# Patient Record
Sex: FEMALE | Race: WHITE | NOT HISPANIC OR LATINO | Employment: OTHER
[De-identification: names, ages, dates, MRNs, and addresses within clinical notes are randomized per-mention and may not be internally consistent; named-entity substitution may affect disease eponyms.]

---

## 2023-10-11 ENCOUNTER — TELEPHONE (OUTPATIENT)
Dept: OTHER | Age: 78
End: 2023-10-11
Payer: MEDICARE

## 2023-10-11 NOTE — TELEPHONE ENCOUNTER
Pharmacy calling in to get medications rerouted to their location.   Clonazepam 0.5mg 1/2 tablet 3 times a day.     Please send to [facility].   Phone: [phone].

## 2023-10-12 ENCOUNTER — TELEPHONE (OUTPATIENT)
Dept: OTHER | Age: 78
End: 2023-10-12
Payer: MEDICARE

## 2023-10-12 DIAGNOSIS — F41.1 GAD (GENERALIZED ANXIETY DISORDER): ICD-10-CM

## 2023-10-12 DIAGNOSIS — Z79.899 LONG-TERM CURRENT USE OF BENZODIAZEPINE: ICD-10-CM

## 2023-10-12 DIAGNOSIS — G47.09 OTHER INSOMNIA: ICD-10-CM

## 2023-10-12 DIAGNOSIS — F41.9 ANXIETY AND DEPRESSION: ICD-10-CM

## 2023-10-12 DIAGNOSIS — F41.8 DEPRESSION WITH ANXIETY: ICD-10-CM

## 2023-10-12 DIAGNOSIS — F32.A ANXIETY AND DEPRESSION: ICD-10-CM

## 2023-10-12 RX ORDER — CLONAZEPAM 0.5 MG/1
0.25 TABLET ORAL 3 TIMES DAILY
Qty: 45 TABLET | Refills: 0 | Status: SHIPPED | OUTPATIENT
Start: 2023-10-12 | End: 2024-05-02 | Stop reason: SDUPTHER

## 2023-10-12 NOTE — PROGRESS NOTES
Oarrs ran , will need to still do urine drug screen and benzodiazepine confirm within one month , reviewed most recent note , patient had called and requested a refill to a new pharmacy as the current one is out of stock of the medication . Sent a 30 day med supply. Nursing to do psychoeducation related to med use .[facility]

## 2023-10-13 ENCOUNTER — LAB (OUTPATIENT)
Dept: LAB | Facility: LAB | Age: 78
End: 2023-10-13
Payer: MEDICARE

## 2023-10-13 DIAGNOSIS — Z79.899 LONG-TERM CURRENT USE OF BENZODIAZEPINE: ICD-10-CM

## 2023-10-13 LAB
AMPHETAMINES UR QL SCN: NORMAL
BARBITURATES UR QL SCN: NORMAL
BENZODIAZ UR QL SCN: NORMAL
BZE UR QL SCN: NORMAL
CANNABINOIDS UR QL SCN: NORMAL
FENTANYL+NORFENTANYL UR QL SCN: NORMAL
OPIATES UR QL SCN: NORMAL
OXYCODONE+OXYMORPHONE UR QL SCN: NORMAL
PCP UR QL SCN: NORMAL

## 2023-10-13 PROCEDURE — 80307 DRUG TEST PRSMV CHEM ANLYZR: CPT

## 2023-10-13 PROCEDURE — 80346 BENZODIAZEPINES1-12: CPT

## 2023-10-17 LAB
1OH-MIDAZOLAM UR CFM-MCNC: <25 NG/ML
7AMINOCLONAZEPAM UR CFM-MCNC: 378 NG/ML
A-OH ALPRAZ UR CFM-MCNC: <25 NG/ML
ALPRAZ UR CFM-MCNC: <25 NG/ML
CHLORDIAZEP UR CFM-MCNC: <25 NG/ML
CLONAZEPAM UR CFM-MCNC: <25 NG/ML
DIAZEPAM UR CFM-MCNC: <25 NG/ML
LORAZEPAM UR CFM-MCNC: <25 NG/ML
MIDAZOLAM UR CFM-MCNC: <25 NG/ML
NORDIAZEPAM UR CFM-MCNC: <25 NG/ML
OXAZEPAM UR CFM-MCNC: <25 NG/ML
TEMAZEPAM UR CFM-MCNC: <25 NG/ML

## 2023-11-01 ENCOUNTER — TELEPHONE (OUTPATIENT)
Dept: BEHAVIORAL HEALTH | Facility: CLINIC | Age: 78
End: 2023-11-01
Payer: MEDICARE

## 2023-11-01 NOTE — TELEPHONE ENCOUNTER
----- Message from Edith Lagunas MA sent at 10/31/2023  1:32 PM EDT -----  Regarding: pt callback  Pt would like a callback about clonazepam medication, pt is short on prescription would like an extra set of pills called the pharmacy until next med pick  [phone] pts callback number

## 2023-11-01 NOTE — PROGRESS NOTES
Non billable note : reviewed oarrs after received message from staff patient requests a call from me and it mentions about needing a refill for clonazepam , she should not be out per oarrs , filled this in October . Reviewed most recent appointment psych note and she is scheduled for an appointment in mid November. Left a voicemail general in nature indicating patient can call me and provide more details about her medication concern . [facility]

## 2023-11-06 PROBLEM — Z20.822 EXPOSURE TO COVID-19 VIRUS: Status: ACTIVE | Noted: 2023-11-06

## 2023-11-06 PROBLEM — J20.9 ACUTE BRONCHITIS, UNSPECIFIED: Status: ACTIVE | Noted: 2023-11-06

## 2023-11-06 PROBLEM — R44.0 AUDITORY HALLUCINATION: Status: ACTIVE | Noted: 2023-11-06

## 2023-11-06 PROBLEM — E55.9 VITAMIN D DEFICIENCY: Status: ACTIVE | Noted: 2023-11-06

## 2023-11-06 PROBLEM — F05 SUNDOWN SYNDROME: Status: ACTIVE | Noted: 2023-11-06

## 2023-11-06 PROBLEM — M81.0 AGE RELATED OSTEOPOROSIS: Status: ACTIVE | Noted: 2023-11-06

## 2023-11-06 PROBLEM — L70.9 ACNE: Status: ACTIVE | Noted: 2023-11-06

## 2023-11-06 PROBLEM — G47.9 SLEEP DIFFICULTIES: Status: ACTIVE | Noted: 2023-11-06

## 2023-11-06 PROBLEM — L29.9 PRURITUS: Status: ACTIVE | Noted: 2023-11-06

## 2023-11-06 PROBLEM — F41.0 PANIC ATTACKS: Status: ACTIVE | Noted: 2023-11-06

## 2023-11-06 PROBLEM — R00.2 HEART PALPITATIONS: Status: ACTIVE | Noted: 2023-11-06

## 2023-11-06 PROBLEM — E78.49 OTHER HYPERLIPIDEMIA: Status: ACTIVE | Noted: 2023-11-06

## 2023-11-06 PROBLEM — J44.9 CHRONIC OBSTRUCTIVE PULMONARY DISEASE (MULTI): Status: ACTIVE | Noted: 2023-11-06

## 2023-11-06 PROBLEM — R05.3 COUGH, PERSISTENT: Status: ACTIVE | Noted: 2023-11-06

## 2023-11-06 PROBLEM — F41.9 ANXIETY: Status: ACTIVE | Noted: 2023-11-06

## 2023-11-06 PROBLEM — R31.29 MICROSCOPIC HEMATURIA: Status: ACTIVE | Noted: 2023-11-06

## 2023-11-06 PROBLEM — D72.829 LEUCOCYTOSIS: Status: ACTIVE | Noted: 2023-11-06

## 2023-11-06 PROBLEM — R44.1 VISUAL HALLUCINATIONS: Status: ACTIVE | Noted: 2023-11-06

## 2023-11-06 PROBLEM — R53.83 FATIGUE: Status: ACTIVE | Noted: 2023-11-06

## 2023-11-06 PROBLEM — F41.8 DEPRESSION WITH ANXIETY: Status: ACTIVE | Noted: 2023-11-06

## 2023-11-06 RX ORDER — LEVOFLOXACIN 750 MG/1
750 TABLET ORAL
COMMUNITY
Start: 2013-04-09

## 2023-11-06 RX ORDER — CLONAZEPAM 0.5 MG/1
TABLET ORAL
COMMUNITY
Start: 2021-08-11 | End: 2023-11-13 | Stop reason: SDUPTHER

## 2023-11-06 RX ORDER — ALBUTEROL SULFATE 90 UG/1
2 AEROSOL, METERED RESPIRATORY (INHALATION) EVERY 4 HOURS PRN
COMMUNITY
Start: 2021-12-24 | End: 2024-02-05 | Stop reason: WASHOUT

## 2023-11-06 RX ORDER — IBUPROFEN 800 MG/1
1 TABLET ORAL EVERY 8 HOURS PRN
COMMUNITY
Start: 2022-05-18

## 2023-11-06 RX ORDER — VENLAFAXINE HYDROCHLORIDE 37.5 MG/1
37.5 CAPSULE, EXTENDED RELEASE ORAL
COMMUNITY
End: 2023-11-13 | Stop reason: SDUPTHER

## 2023-11-06 RX ORDER — RISPERIDONE 0.25 MG/1
0.25 TABLET ORAL NIGHTLY
COMMUNITY
End: 2023-11-13 | Stop reason: SDUPTHER

## 2023-11-06 RX ORDER — HYDROCODONE BITARTRATE AND HOMATROPINE METHYLBROMIDE ORAL SOLUTION 5; 1.5 MG/5ML; MG/5ML
5 LIQUID ORAL EVERY 6 HOURS PRN
COMMUNITY
Start: 2021-12-24 | End: 2024-05-02 | Stop reason: WASHOUT

## 2023-11-06 RX ORDER — VENLAFAXINE HYDROCHLORIDE 75 MG/1
75 CAPSULE, EXTENDED RELEASE ORAL DAILY
COMMUNITY
Start: 2023-10-20 | End: 2023-11-13 | Stop reason: SDUPTHER

## 2023-11-06 RX ORDER — VIT C/E/ZN/COPPR/LUTEIN/ZEAXAN 250MG-90MG
1 CAPSULE ORAL DAILY
COMMUNITY
Start: 2022-04-05

## 2023-11-13 ENCOUNTER — TELEMEDICINE (OUTPATIENT)
Dept: BEHAVIORAL HEALTH | Facility: CLINIC | Age: 78
End: 2023-11-13
Payer: MEDICARE

## 2023-11-13 DIAGNOSIS — F32.A DEPRESSION, UNSPECIFIED DEPRESSION TYPE: ICD-10-CM

## 2023-11-13 DIAGNOSIS — F41.1 GAD (GENERALIZED ANXIETY DISORDER): ICD-10-CM

## 2023-11-13 PROCEDURE — 99213 OFFICE O/P EST LOW 20 MIN: CPT | Performed by: CLINICAL NURSE SPECIALIST

## 2023-11-13 RX ORDER — RISPERIDONE 0.25 MG/1
0.25 TABLET ORAL NIGHTLY
Qty: 90 TABLET | Refills: 0 | Status: SHIPPED | OUTPATIENT
Start: 2023-11-13 | End: 2024-02-05 | Stop reason: SDUPTHER

## 2023-11-13 RX ORDER — CLONAZEPAM 0.5 MG/1
0.25 TABLET ORAL 3 TIMES DAILY
Qty: 45 TABLET | Refills: 2 | Status: SHIPPED | OUTPATIENT
Start: 2023-11-13 | End: 2024-02-05 | Stop reason: SDUPTHER

## 2023-11-13 RX ORDER — VENLAFAXINE HYDROCHLORIDE 75 MG/1
75 CAPSULE, EXTENDED RELEASE ORAL DAILY
Qty: 90 CAPSULE | Refills: 0 | Status: SHIPPED | OUTPATIENT
Start: 2023-11-13 | End: 2024-02-05 | Stop reason: SDUPTHER

## 2023-11-13 RX ORDER — VENLAFAXINE HYDROCHLORIDE 37.5 MG/1
37.5 CAPSULE, EXTENDED RELEASE ORAL
Qty: 90 CAPSULE | Refills: 0 | Status: SHIPPED | OUTPATIENT
Start: 2023-11-13 | End: 2024-02-05 | Stop reason: WASHOUT

## 2023-11-13 NOTE — PROGRESS NOTES
Outpatient Psychiatry      Subjective   Ara Dacosta, a 78 y.o. female, presents as an established patient for a medication management /outpatient psychiatry appointment     Diagnosis:   Depression , unspecified depression type   Generalized anxiety disorder     Patient Active Problem List   Diagnosis    Acne    Acute bronchitis, unspecified    Age related osteoporosis    Anxiety    Auditory hallucination    Visual hallucinations    Chronic obstructive pulmonary disease (CMS/HCC)    Cough, persistent    Depression with anxiety    Exposure to COVID-19 virus    Fatigue    Heart palpitations    Leucocytosis    Microscopic hematuria    Other hyperlipidemia    Panic attacks    Pruritus    Sleep difficulties    SunDown syndrome    Tobacco use disorder    Vitamin D deficiency       Treatment Goals:  Specify outcomes written in observable, behavioral terms:   Anxiety: reducing negative automatic thoughts and reducing physical symptoms of anxiety  Depression: increasing energy, increasing motivation, and reducing fatigue    Treatment Plan/Recommendations:   follow up for medications in 3 months   can call  for any treatment concerns   can continue self care and wellness efforts and maintain routine health screenings  Follow-up plan was discussed with patient.    Review with patient: Treatment plan reviewed with the patient.  Medication risks/benefit reviewed with the patient    HPI:  sleep is good  no psychoses   mood stable   no recent hospitalizations or er visits   she is aware of the precautions and risks with clonazepam, as increased sedation , potential increased sedation with other sedating medications , and discussed avoiding alcohol, discussed the potential for effect on memory and cognitive functioning , and discussed the potential for tolerance and dependence and addiction   no issues with substance abuse   no concerns with cognitive functioning   reviewed psychotropic medications and rationale  for them , and reconciled medication list   she does not have any side effect concerns with her medications     Review of Systems     Depressive Symptoms: not depressed or irritable,~no loss of interest,~no change in appetite,~no recent lb weight gain,~no recent lb weight loss,~no insomnia,~no fatigue or loss of energy,~not feeling worthless or guilty,~normal concentration,~ability to make decisions,~no suicidal ideation,~no guns or weapons in household.   Manic Symptoms: mood is not irritable or elevated,~self esteem is not grandiose or increased,~no changes in need for sleep,~not more talkative than usual,~does not have flight of ideas or racing thoughts,~no distractibility,~no psychomotor agitation or increased goal-directed activity,~no excessive involvement in pleasurable activities.   Psychotic Symptoms: no hallucinations,~no delusions,~no disorganized speech,~does not have disorganized behavior or catatonia,~no negative symptoms.   Anxiety Symptoms: exposure to traumatic event, but~no panic attacks,~no concerns about future panic attacks,~no worry about panic attack consequences,~no change in behavior due to panic attacks,~no excessive worry,~no difficulty controlling worry,~no increase in arousal,~not easily fatigued due to worry,~no difficulty concentrating due to worry,~no irritability due to worry,~no muscle tension due to worry,~no sleep disturbances due to worry,~no specific phobia,~no social phobia,~no obsessions,~no compulsions,~no re-experiencing of traumatic event,~no avoidance of stimuli and number of responsiveness,~no restlessness / feeling on edge due to worry.   Delirium/ Altered Mental Status Symptoms: no disturbances of consciousness,~no diminished ability to focus, sustain, shift attention,~no change in cognition or perceptual disturbances,~symptoms do not fluctuate during the course of the day,~general medical condition is not present.   Disordered Eating Symptoms: weight is not less than  85% of ideal body weight,~no intense fear of gaining weight,~does not have a poor body image,~no restricting of diet and/or excessive exercise,~no purging or laxative use.   Post-traumatic stress disorder symptoms The patient is currently asymptomatic.   Inattentive Symptoms: does not make careless mistakes often,~does not have difficulty paying attention,~not often disorganized,~does not lose things often,~is not easily distracted,~is not often forgetful,~does not avoid/dislike tasks with sustained mental effort,~listens when spoken to directly,~is able to follow instructions and finish schoolwork.   Conduct Issues: no aggression towards people or animals,~no destruction of property,~no deceitfulness,~does not violate rules.   Other Symptoms/ Concerns: no symptoms of separation anxiety,~no reactive attachment symptoms,~no motor tics,~no vocal tics,~no stuttering,~no phonological problems,~no loss of urine control,~no encopresis,~no intellectual disability,~no self-injurious behaviors,~not somatic and no conversion symptoms,~no gender identity symptoms,~no sleep disorder symptoms,~no impulse control symptoms,~no personality disorder symptoms.       Constitutional: no sleep apnea,~normal sleeping,~no night wakings,~no snoring,~not a picky eater,~normal appetite,~no swallowing problems,~no night terrors,~no nightmares,~no restless sleep,~no snorts/gasps~and~no obesity.   Eyes: no vision test,~no vision impairment,~does not wear glasses/contacts,~does not wear glassess/contacts~and~no blindness.   ENT: no hearing tested,~no hearing loss,~no hearing aid,~no cochlear implant,~no excessive drooling,~no dental problems~and~no recurrent strep throat.   Cardiovascular: no murmur,~no heart defect,~no chest pain,~no palpitations~and~no syncope.   Respiratory: no wheezing~and~no asthma/reactive airway disease.   Gastrointestinal: no constipation,~no abdominal pain,~no nausea,~no vomiting,~no diarrhea,~no blood in stools,~no  g-tube~and~no reflux.   Genitourinary: no nocturnal enuresis,~no diurnal enuresis~and~no incontinence.   Musculoskeletal: normal gait~and~no torticollis, but~moving all extremities well and symmetrical,~no arthritis or joint problems,~no myalgias,~no muscle weakness~and~normal hand preference.   Integumentary: no changes in moles or birthmarks,~no rashes~and~no atopic dermatitis.   Neurological: no symmetrical facies,~no headache,~no head injury,~no seizures,~no staring spells,~no loss of consciousness,~no meningitis/encephalitis,~no cerebral palsy,~no spina bifida,~no stereotypy,~no developmental regression~and~no tics or twitches.   Endocrine: no temperature intolerance,~,~good growth~and~no failure to thrive.   Hematologic/Lymphatic: no anemia~and~no lead poisoning.      I have personally reviewed the OARRS report for NITISH VELAZQUEZ. I have considered the risks of abuse, dependence, addiction and diversion.   I have the following concerns: no concerns with oarrs.   Is the patient prescribed a combination of a benzodiazepine and opioid? No.   Last urine drug screening date October 2023 , results as expected   Date of the last Controlled Substance Agreement: verbally reviewed csa   BENZODIAZEPINES   What is the patient's goal of therapy? to manage maximilian and reduce intense anxiety.  Is this being achieved with current treatment? yes , she is able to attend to day to day activities.   MAXIMILIAN-7   1. Feeling nervous, anxious or on edge- not at all  2. Not being able to stop or control worrying - not at all  3. Worrying too much about different things - not at all  4. Trouble relaxing - not at all  5. Being so restless that it is hard to sit still - not at all  6. Becoming easily annoyed or irritable - not at all  7. Feeling afraid as if something awful might happen - not at all  Total Score = 0  Activities of Daily Living:   Yes, it is my opinion that this patient is benefitting from benzodiazepine therapy.   Physical  functioning: Better   Family relationships: Better   Social relationships: Better   Mood: Better   Sleep patterns: Better   Overall functioning: Better   Current or Past Use of Non-Controlled Medication: Other: risperidone .   Current Outpatient Medications:     albuterol 90 mcg/actuation inhaler, Inhale 2 puffs every 4 hours if needed., Disp: , Rfl:     cholecalciferol (Vitamin D-3) 25 MCG (1000 UT) capsule, Take 1 capsule (25 mcg) by mouth once daily., Disp: , Rfl:     clonazePAM (KlonoPIN) 0.5 mg tablet, Take by mouth., Disp: , Rfl:     diclofenac sodium 1 % kit, APPLY TOPICALLY TO AFFECTED AREA UP TO 4 TIMES DAILY, Disp: , Rfl:     hydrocodone-homatropine 5-1.5 mg/5 mL syrup, Take 5 mL by mouth every 6 hours if needed., Disp: , Rfl:     ibuprofen 800 mg tablet, Take 1 tablet (800 mg) by mouth every 8 hours if needed., Disp: , Rfl:     levoFLOXacin (Levaquin) 750 mg tablet, Take 1 tablet (750 mg) by mouth once daily., Disp: , Rfl:     venlafaxine XR (Effexor-XR) 75 mg 24 hr capsule, Take 1 capsule (75 mg) by mouth once daily., Disp: , Rfl:     clonazePAM (KlonoPIN) 0.5 mg tablet, Take 0.5 tablets (0.25 mg) by mouth 3 times a day., Disp: 45 tablet, Rfl: 0    risperiDONE (RisperDAL) 0.25 mg tablet, Take 1 tablet (0.25 mg) by mouth once daily at bedtime., Disp: , Rfl:     venlafaxine XR (Effexor-XR) 37.5 mg 24 hr capsule, Take 1 capsule (37.5 mg) by mouth once daily in the evening.  Take before meals., Disp: , Rfl:   Medical History:  Past Medical History:   Diagnosis Date    Personal history of other diseases of the respiratory system 04/20/2016    History of acute bronchitis    Sciatica, right side 12/30/2015    Sciatica, right side     Surgical History:  Past Surgical History:   Procedure Laterality Date    OTHER SURGICAL HISTORY  08/22/2017    Laparoscopic Excision Of Ectopic Pregnancy And Salpingectomy     Family History:  Family History   Problem Relation Name Age of Onset    Other (alcohol dependence) Father       Anxiety disorder Father      Alzheimer's disease Father      Other (CVA) Father      Other (Alcohol dependence) Paternal Grandfather      Anxiety disorder Paternal Grandfather       Social History:  Social History     Socioeconomic History    Marital status:      Spouse name: Not on file    Number of children: Not on file    Years of education: Not on file    Highest education level: Not on file   Occupational History    Not on file   Tobacco Use    Smoking status: Not on file    Smokeless tobacco: Not on file   Substance and Sexual Activity    Alcohol use: Not on file    Drug use: Not on file    Sexual activity: Not on file   Other Topics Concern    Not on file   Social History Narrative    Not on file     Social Determinants of Health     Financial Resource Strain: Not on file   Food Insecurity: Not on file   Transportation Needs: Not on file   Physical Activity: Not on file   Stress: Not on file   Social Connections: Not on file   Intimate Partner Violence: Not on file   Housing Stability: Not on file       Tasia Cage, APRN-CNS

## 2024-02-05 ENCOUNTER — TELEMEDICINE (OUTPATIENT)
Dept: BEHAVIORAL HEALTH | Facility: CLINIC | Age: 79
End: 2024-02-05
Payer: MEDICARE

## 2024-02-05 DIAGNOSIS — F32.A DEPRESSION, UNSPECIFIED DEPRESSION TYPE: ICD-10-CM

## 2024-02-05 DIAGNOSIS — F41.1 GAD (GENERALIZED ANXIETY DISORDER): ICD-10-CM

## 2024-02-05 PROCEDURE — 99213 OFFICE O/P EST LOW 20 MIN: CPT | Performed by: CLINICAL NURSE SPECIALIST

## 2024-02-05 RX ORDER — VENLAFAXINE HYDROCHLORIDE 75 MG/1
75 CAPSULE, EXTENDED RELEASE ORAL DAILY
Qty: 90 CAPSULE | Refills: 0 | Status: SHIPPED | OUTPATIENT
Start: 2024-02-05 | End: 2024-05-02 | Stop reason: SDUPTHER

## 2024-02-05 RX ORDER — RISPERIDONE 0.25 MG/1
0.25 TABLET ORAL NIGHTLY
Qty: 90 TABLET | Refills: 0 | Status: SHIPPED | OUTPATIENT
Start: 2024-02-05 | End: 2024-05-02 | Stop reason: SDUPTHER

## 2024-02-05 RX ORDER — CLONAZEPAM 0.5 MG/1
0.25 TABLET ORAL 3 TIMES DAILY
Qty: 45 TABLET | Refills: 2 | Status: SHIPPED | OUTPATIENT
Start: 2024-02-05 | End: 2024-06-05 | Stop reason: SDUPTHER

## 2024-02-05 NOTE — PROGRESS NOTES
Outpatient Psychiatry      Subjective     Ara Dacosta, a 79 y.o. female presents as an established patient for a medication management /outpatient psychiatry follow up appointment      Diagnosis:   Depression , unspecified depression type   Generalized anxiety disorder      Patient Active Problem List   Diagnosis    Acne    Acute bronchitis, unspecified    Age related osteoporosis    Anxiety    Auditory hallucination    Visual hallucinations    Chronic obstructive pulmonary disease (CMS/HCC)    Cough, persistent    Depression with anxiety    Exposure to COVID-19 virus    Fatigue    Heart palpitations    Leucocytosis    Microscopic hematuria    Other hyperlipidemia    Panic attacks    Pruritus    Sleep difficulties    SunDown syndrome    Tobacco use disorder    Vitamin D deficiency     Treatment Plan/Recommendations: follow up for medications in 3 months   can call  for any treatment concerns   can continue self care and wellness efforts and maintain routine health screenings  Follow-up plan was discussed with patient.      Review with patient: Treatment plan reviewed with the patient.  Medication risks/benefit reviewed with the patient    HPI:sleep is good  no psychoses   mood stable   no new medical conditions    she is aware of the precautions and risks with clonazepam, as increased sedation , potential increased sedation with other sedating medications , and discussed avoiding alcohol, discussed the potential for effect on memory and cognitive functioning , and discussed the potential for tolerance and dependence and addiction   no issues with substance abuse   no concerns with cognitive functioning   reviewed psychotropic medications and rationale for them , and reconciled medication list   she does not have any side effect concerns with her medications    I have personally reviewed the OARRS report for ARA DACOSTA. I have considered the risks of abuse, dependence, addiction and  diversion.   I have the following concerns: no concerns with oarrs.   Is the patient prescribed a combination of a benzodiazepine and opioid? No.   Last urine drug screening date October 2023 , results as expected   Date of the last Controlled Substance Agreement: verbally reviewed csa   BENZODIAZEPINES   What is the patient's goal of therapy? to manage maximilian and reduce intense anxiety.  Is this being achieved with current treatment? yes , she is able to attend to day to day activities.    Current Outpatient Medications:     albuterol 90 mcg/actuation inhaler, Inhale 2 puffs every 4 hours if needed., Disp: , Rfl:     cholecalciferol (Vitamin D-3) 25 MCG (1000 UT) capsule, Take 1 capsule (25 mcg) by mouth once daily., Disp: , Rfl:     clonazePAM (KlonoPIN) 0.5 mg tablet, Take 0.5 tablets (0.25 mg) by mouth 3 times a day., Disp: 45 tablet, Rfl: 0    clonazePAM (KlonoPIN) 0.5 mg tablet, Take 0.5 tablets (0.25 mg) by mouth 3 times a day., Disp: 45 tablet, Rfl: 2    diclofenac sodium 1 % kit, APPLY TOPICALLY TO AFFECTED AREA UP TO 4 TIMES DAILY, Disp: , Rfl:     hydrocodone-homatropine 5-1.5 mg/5 mL syrup, Take 5 mL by mouth every 6 hours if needed., Disp: , Rfl:     ibuprofen 800 mg tablet, Take 1 tablet (800 mg) by mouth every 8 hours if needed., Disp: , Rfl:     levoFLOXacin (Levaquin) 750 mg tablet, Take 1 tablet (750 mg) by mouth once daily., Disp: , Rfl:     risperiDONE (RisperDAL) 0.25 mg tablet, Take 1 tablet (0.25 mg) by mouth once daily at bedtime., Disp: 90 tablet, Rfl: 0    venlafaxine XR (Effexor-XR) 37.5 mg 24 hr capsule, Take 1 capsule (37.5 mg) by mouth once daily in the evening.  Take before meals., Disp: 90 capsule, Rfl: 0    venlafaxine XR (Effexor-XR) 75 mg 24 hr capsule, Take 1 capsule (75 mg) by mouth once daily., Disp: 90 capsule, Rfl: 0  Medical History:  Past Medical History:   Diagnosis Date    Personal history of other diseases of the respiratory system 04/20/2016    History of acute bronchitis     Sciatica, right side 12/30/2015    Sciatica, right side     Surgical History:  Past Surgical History:   Procedure Laterality Date    OTHER SURGICAL HISTORY  08/22/2017    Laparoscopic Excision Of Ectopic Pregnancy And Salpingectomy     Family History:  Family History   Problem Relation Name Age of Onset    Other (alcohol dependence) Father      Anxiety disorder Father      Alzheimer's disease Father      Other (CVA) Father      Other (Alcohol dependence) Paternal Grandfather      Anxiety disorder Paternal Grandfather       Social History:  Social History     Socioeconomic History    Marital status:      Spouse name: Not on file    Number of children: Not on file    Years of education: Not on file    Highest education level: Not on file   Occupational History    Not on file   Tobacco Use    Smoking status: Every Day     Packs/day: .5     Types: Cigarettes    Smokeless tobacco: Never   Substance and Sexual Activity    Alcohol use: Never    Drug use: Never    Sexual activity: Not on file   Other Topics Concern    Not on file   Social History Narrative    Not on file     Social Determinants of Health     Financial Resource Strain: Not on file   Food Insecurity: Not on file   Transportation Needs: Not on file   Physical Activity: Not on file   Stress: Not on file   Social Connections: Not on file   Intimate Partner Violence: Not on file   Housing Stability: Not on file     Vitals:  There were no vitals filed for this visit.    Tasia Cage, APRN-CNS

## 2024-05-02 ENCOUNTER — DOCUMENTATION (OUTPATIENT)
Dept: BEHAVIORAL HEALTH | Facility: CLINIC | Age: 79
End: 2024-05-02

## 2024-05-02 ENCOUNTER — TELEPHONE (OUTPATIENT)
Dept: OTHER | Age: 79
End: 2024-05-02

## 2024-05-02 DIAGNOSIS — F41.1 GAD (GENERALIZED ANXIETY DISORDER): ICD-10-CM

## 2024-05-02 DIAGNOSIS — F32.A DEPRESSION, UNSPECIFIED DEPRESSION TYPE: ICD-10-CM

## 2024-05-02 RX ORDER — RISPERIDONE 0.25 MG/1
0.25 TABLET ORAL NIGHTLY
Qty: 90 TABLET | Refills: 0 | Status: SHIPPED | OUTPATIENT
Start: 2024-05-02 | End: 2024-06-05 | Stop reason: SDUPTHER

## 2024-05-02 RX ORDER — VENLAFAXINE HYDROCHLORIDE 75 MG/1
75 CAPSULE, EXTENDED RELEASE ORAL DAILY
Qty: 90 CAPSULE | Refills: 0 | Status: SHIPPED | OUTPATIENT
Start: 2024-05-02 | End: 2024-06-05 | Stop reason: SDUPTHER

## 2024-05-02 RX ORDER — CLONAZEPAM 0.5 MG/1
0.25 TABLET ORAL 3 TIMES DAILY
Qty: 45 TABLET | Refills: 0 | Status: SHIPPED | OUTPATIENT
Start: 2024-05-02 | End: 2024-06-01

## 2024-06-05 ENCOUNTER — APPOINTMENT (OUTPATIENT)
Dept: BEHAVIORAL HEALTH | Facility: CLINIC | Age: 79
End: 2024-06-05
Payer: MEDICARE

## 2024-06-05 DIAGNOSIS — F41.1 GAD (GENERALIZED ANXIETY DISORDER): ICD-10-CM

## 2024-06-05 DIAGNOSIS — F32.A DEPRESSION, UNSPECIFIED DEPRESSION TYPE: ICD-10-CM

## 2024-06-05 PROCEDURE — 99213 OFFICE O/P EST LOW 20 MIN: CPT | Performed by: CLINICAL NURSE SPECIALIST

## 2024-06-05 RX ORDER — VENLAFAXINE HYDROCHLORIDE 37.5 MG/1
37.5 CAPSULE, EXTENDED RELEASE ORAL DAILY
Qty: 7 CAPSULE | Refills: 0 | Status: SHIPPED | OUTPATIENT
Start: 2024-06-05 | End: 2024-06-12

## 2024-06-05 RX ORDER — RISPERIDONE 0.25 MG/1
0.25 TABLET ORAL NIGHTLY
Qty: 90 TABLET | Refills: 0 | Status: SHIPPED | OUTPATIENT
Start: 2024-06-05 | End: 2024-09-03

## 2024-06-05 RX ORDER — CLONAZEPAM 0.5 MG/1
0.25 TABLET ORAL 3 TIMES DAILY
Qty: 45 TABLET | Refills: 2 | Status: SHIPPED | OUTPATIENT
Start: 2024-06-05 | End: 2024-07-05

## 2024-06-05 RX ORDER — SERTRALINE HYDROCHLORIDE 50 MG/1
50 TABLET, FILM COATED ORAL DAILY
Qty: 30 TABLET | Refills: 1 | Status: SHIPPED | OUTPATIENT
Start: 2024-06-05 | End: 2024-07-05

## 2024-06-26 ENCOUNTER — APPOINTMENT (OUTPATIENT)
Dept: BEHAVIORAL HEALTH | Facility: CLINIC | Age: 79
End: 2024-06-26
Payer: MEDICARE

## 2024-06-26 DIAGNOSIS — F41.1 GAD (GENERALIZED ANXIETY DISORDER): ICD-10-CM

## 2024-06-26 DIAGNOSIS — F32.A DEPRESSION, UNSPECIFIED DEPRESSION TYPE: ICD-10-CM

## 2024-06-26 PROCEDURE — 1160F RVW MEDS BY RX/DR IN RCRD: CPT | Performed by: CLINICAL NURSE SPECIALIST

## 2024-06-26 PROCEDURE — 1159F MED LIST DOCD IN RCRD: CPT | Performed by: CLINICAL NURSE SPECIALIST

## 2024-06-26 PROCEDURE — 99212 OFFICE O/P EST SF 10 MIN: CPT | Performed by: CLINICAL NURSE SPECIALIST

## 2024-06-26 RX ORDER — RISPERIDONE 0.25 MG/1
0.25 TABLET ORAL NIGHTLY
Qty: 90 TABLET | Refills: 1 | Status: SHIPPED | OUTPATIENT
Start: 2024-06-26 | End: 2024-09-24

## 2024-06-26 RX ORDER — SERTRALINE HYDROCHLORIDE 50 MG/1
50 TABLET, FILM COATED ORAL DAILY
Qty: 90 TABLET | Refills: 1 | Status: SHIPPED | OUTPATIENT
Start: 2024-06-26 | End: 2024-09-24

## 2024-06-26 RX ORDER — CLONAZEPAM 0.5 MG/1
0.25 TABLET ORAL 3 TIMES DAILY
Qty: 45 TABLET | Refills: 2 | Status: SHIPPED | OUTPATIENT
Start: 2024-06-26 | End: 2024-07-26

## 2024-06-26 NOTE — PROGRESS NOTES
Outpatient Psychiatry      Subjective     Ara Dacosta, a 79 y.o. female, presents as an established patient for a medication management /outpatient psychiatry follow up appointment for an audio visual appointment , patient consented to the appointment and she asked that her daughter be present for the appointment     Diagnosis:   Depression , unspecified depression type (in remission , stable )  Generalized anxiety disorder ( stable )     Treatment Plan/Recommendations: follow up for medications in 3 months   can call  for any treatment concerns   can continue self care and wellness efforts and maintain routine health screenings  Continue clonazepam 0.5 mg , 1/2 tablet three times a day for anxiety   Cross titrating off venlafaxine er onto sertraline , venlafaxine xr 37.5 mg , daily for 7 days then stop , can start 50 mg , 1/2 tablet daily for one week then 1 tablet daily and stay at that dose for anxiety   Follow-up plan was discussed with patient.    Review with patient: Treatment plan reviewed with the patient.  Medication risks/benefit reviewed with the patient    HPI:sleep is good  no psychoses   mood stable   no new medical conditions    she is aware of the precautions and risks with clonazepam, as increased sedation , potential increased sedation with other sedating medications , and discussed avoiding alcohol, discussed the potential for effect on memory and cognitive functioning , and discussed the potential for tolerance and dependence and addiction   no issues with substance abuse   no concerns with cognitive functioning   reviewed psychotropic medications and rationale for them , and reconciled medication list   No new health conditions   No balance issues , no recent falls   Coping skills are strong and insight is good   Patient desires switching to a trial of sertraline , she says she has a family member whom takes this and it is effective for them , we discussed the cross titration      she does not have any side effect concerns with her medications    I have personally reviewed the OARRS report for NITISH VELAZQUEZ. I have considered the risks of abuse, dependence, addiction and diversion.   I have the following concerns: no concerns with oarrs.   Is the patient prescribed a combination of a benzodiazepine and opioid? No.   Last urine drug screening date October 2023 , results as expected   Date of the last Controlled Substance Agreement: verbally reviewed csa   BENZODIAZEPINES   What is the patient's goal of therapy? to manage maximilian and reduce intense anxiety.  Is this being achieved with current treatment? yes , she is able to attend to day to day activities.         Current Outpatient Medications:     cholecalciferol (Vitamin D-3) 25 MCG (1000 UT) capsule, Take 1 capsule (25 mcg) by mouth once daily., Disp: , Rfl:     clonazePAM (KlonoPIN) 0.5 mg tablet, Take 0.5 tablets (0.25 mg) by mouth 3 times a day., Disp: 45 tablet, Rfl: 2    clonazePAM (KlonoPIN) 0.5 mg tablet, Take 0.5 tablets (0.25 mg) by mouth 3 times a day., Disp: 45 tablet, Rfl: 2    diclofenac sodium 1 % kit, APPLY TOPICALLY TO AFFECTED AREA UP TO 4 TIMES DAILY, Disp: , Rfl:     ibuprofen 800 mg tablet, Take 1 tablet (800 mg) by mouth every 8 hours if needed., Disp: , Rfl:     levoFLOXacin (Levaquin) 750 mg tablet, Take 1 tablet (750 mg) by mouth once daily., Disp: , Rfl:     risperiDONE (RisperDAL) 0.25 mg tablet, Take 1 tablet (0.25 mg) by mouth once daily at bedtime., Disp: 90 tablet, Rfl: 1    sertraline (Zoloft) 50 mg tablet, Take 1 tablet (50 mg) by mouth once daily., Disp: 90 tablet, Rfl: 1  Medical History:  Past Medical History:   Diagnosis Date    Personal history of other diseases of the respiratory system 04/20/2016    History of acute bronchitis    Sciatica, right side 12/30/2015    Sciatica, right side     Surgical History:  Past Surgical History:   Procedure Laterality Date    OTHER SURGICAL HISTORY  08/22/2017     Laparoscopic Excision Of Ectopic Pregnancy And Salpingectomy     Family History:  Family History   Problem Relation Name Age of Onset    Other (alcohol dependence) Father      Anxiety disorder Father      Alzheimer's disease Father      Other (CVA) Father      Other (Alcohol dependence) Paternal Grandfather      Anxiety disorder Paternal Grandfather       Social History:  Social History     Socioeconomic History    Marital status:      Spouse name: Not on file    Number of children: Not on file    Years of education: Not on file    Highest education level: Not on file   Occupational History    Not on file   Tobacco Use    Smoking status: Every Day     Current packs/day: 0.50     Types: Cigarettes    Smokeless tobacco: Never   Substance and Sexual Activity    Alcohol use: Never    Drug use: Never    Sexual activity: Not on file   Other Topics Concern    Not on file   Social History Narrative    Not on file     Social Determinants of Health     Financial Resource Strain: Not on file   Food Insecurity: Not on file   Transportation Needs: Not on file   Physical Activity: Not on file   Stress: Not on file   Social Connections: Not on file   Intimate Partner Violence: Not on file   Housing Stability: Not on file     Record Review: brief    Vitals:  There were no vitals filed for this visit.    Tasia Cage, APRN-CNS

## 2024-07-29 NOTE — PROGRESS NOTES
Outpatient Psychiatry      Subjective   Ara Dacosta, a 79 y.o. female, presents as an established patient for a medication management /outpatient psychiatry follow up appointment for an audio visual appointment      Diagnosis:   Depression , unspecified depression type stable   Generalized anxiety disorder stable      Problem List       Patient Active Problem List   Diagnosis    Acne    Acute bronchitis, unspecified    Age related osteoporosis    Anxiety    Auditory hallucination    Visual hallucinations    Chronic obstructive pulmonary disease (CMS/HCC)    Cough, persistent    Depression with anxiety    Exposure to COVID-19 virus    Fatigue    Heart palpitations    Leucocytosis    Microscopic hematuria    Other hyperlipidemia    Panic attacks    Pruritus    Sleep difficulties    SunDown syndrome    Tobacco use disorder    Vitamin D deficiency         Treatment Plan/Recommendations: follow up for medications in 3 months   can call  for any treatment concerns   can continue self care and wellness efforts and maintain routine health screenings  Follow-up plan was discussed with patient.  Psychotropic medications :  Continue Sertraline 50 mg , daily for depression and anxiety   Continue clonazepam 0.5 mg , 1/2 tablet three times a day for anxiety   Continue risperidone 0.25 mg , at bedtime each night for depression         Review with patient: Treatment plan reviewed with the patient.  Medication risks/benefit reviewed with the patient     HPI:sleep is good  no psychoses   mood stable   no new medical conditions    she is aware of the precautions and risks with clonazepam, as increased sedation , potential increased sedation with other sedating medications , and discussed avoiding alcohol, discussed the potential for effect on memory and cognitive functioning , and discussed the potential for tolerance and dependence and addiction   no issues with substance abuse   no concerns with cognitive  functioning   reviewed psychotropic medications and rationale for them , and reconciled medication list   she does not have any side effect concerns with her medications     Psych ros and medical ros negative      I have personally reviewed the OARRS report for NITISH VELAZQUEZ. I have considered the risks of abuse, dependence, addiction and diversion.   I have the following concerns: no concerns with oarrs.   Is the patient prescribed a combination of a benzodiazepine and opioid? No.   Last urine drug screening date October 2023 , results as expected   Date of the last Controlled Substance Agreement: verbally reviewed csa   BENZODIAZEPINES   What is the patient's goal of therapy? to manage maximilian and reduce intense anxiety.  Is this being achieved with current treatment? yes , she is able to attend to day to day activities.         Current Outpatient Medications:     cholecalciferol (Vitamin D-3) 25 MCG (1000 UT) capsule, Take 1 capsule (25 mcg) by mouth once daily., Disp: , Rfl:     clonazePAM (KlonoPIN) 0.5 mg tablet, Take 0.5 tablets (0.25 mg) by mouth 3 times a day., Disp: 45 tablet, Rfl: 2    clonazePAM (KlonoPIN) 0.5 mg tablet, Take 0.5 tablets (0.25 mg) by mouth 3 times a day., Disp: 45 tablet, Rfl: 2    diclofenac sodium 1 % kit, APPLY TOPICALLY TO AFFECTED AREA UP TO 4 TIMES DAILY, Disp: , Rfl:     ibuprofen 800 mg tablet, Take 1 tablet (800 mg) by mouth every 8 hours if needed., Disp: , Rfl:     levoFLOXacin (Levaquin) 750 mg tablet, Take 1 tablet (750 mg) by mouth once daily., Disp: , Rfl:     risperiDONE (RisperDAL) 0.25 mg tablet, Take 1 tablet (0.25 mg) by mouth once daily at bedtime., Disp: 90 tablet, Rfl: 1    sertraline (Zoloft) 50 mg tablet, Take 1 tablet (50 mg) by mouth once daily., Disp: 90 tablet, Rfl: 1  Medical History:  Past Medical History:   Diagnosis Date    Personal history of other diseases of the respiratory system 04/20/2016    History of acute bronchitis    Sciatica, right side  12/30/2015    Sciatica, right side     Surgical History:  Past Surgical History:   Procedure Laterality Date    OTHER SURGICAL HISTORY  08/22/2017    Laparoscopic Excision Of Ectopic Pregnancy And Salpingectomy     Family History:  Family History   Problem Relation Name Age of Onset    Other (alcohol dependence) Father      Anxiety disorder Father      Alzheimer's disease Father      Other (CVA) Father      Other (Alcohol dependence) Paternal Grandfather      Anxiety disorder Paternal Grandfather       Social History:  Social History     Socioeconomic History    Marital status:      Spouse name: Not on file    Number of children: Not on file    Years of education: Not on file    Highest education level: Not on file   Occupational History    Not on file   Tobacco Use    Smoking status: Every Day     Current packs/day: 0.50     Types: Cigarettes    Smokeless tobacco: Never   Substance and Sexual Activity    Alcohol use: Never    Drug use: Never    Sexual activity: Not on file   Other Topics Concern    Not on file   Social History Narrative    Not on file     Social Determinants of Health     Financial Resource Strain: Not on file   Food Insecurity: Not on file   Transportation Needs: Not on file   Physical Activity: Not on file   Stress: Not on file   Social Connections: Not on file   Intimate Partner Violence: Not on file   Housing Stability: Not on file     Vitals:  There were no vitals filed for this visit.    Tasia Cage, APRN-CNS

## 2024-09-11 ENCOUNTER — APPOINTMENT (OUTPATIENT)
Dept: BEHAVIORAL HEALTH | Facility: CLINIC | Age: 79
End: 2024-09-11
Payer: MEDICARE

## 2024-09-11 VITALS
RESPIRATION RATE: 16 BRPM | WEIGHT: 134.4 LBS | HEIGHT: 63 IN | HEART RATE: 94 BPM | BODY MASS INDEX: 23.81 KG/M2 | SYSTOLIC BLOOD PRESSURE: 152 MMHG | DIASTOLIC BLOOD PRESSURE: 84 MMHG | TEMPERATURE: 97.7 F

## 2024-09-11 DIAGNOSIS — F41.1 GAD (GENERALIZED ANXIETY DISORDER): ICD-10-CM

## 2024-09-11 DIAGNOSIS — F32.A DEPRESSION, UNSPECIFIED DEPRESSION TYPE: ICD-10-CM

## 2024-09-11 PROCEDURE — 1126F AMNT PAIN NOTED NONE PRSNT: CPT | Performed by: CLINICAL NURSE SPECIALIST

## 2024-09-11 PROCEDURE — 1160F RVW MEDS BY RX/DR IN RCRD: CPT | Performed by: CLINICAL NURSE SPECIALIST

## 2024-09-11 PROCEDURE — 99214 OFFICE O/P EST MOD 30 MIN: CPT | Performed by: CLINICAL NURSE SPECIALIST

## 2024-09-11 PROCEDURE — 1159F MED LIST DOCD IN RCRD: CPT | Performed by: CLINICAL NURSE SPECIALIST

## 2024-09-11 RX ORDER — SERTRALINE HYDROCHLORIDE 100 MG/1
200 TABLET, FILM COATED ORAL DAILY
Qty: 180 TABLET | Refills: 0 | Status: SHIPPED | OUTPATIENT
Start: 2024-09-11 | End: 2024-09-15 | Stop reason: WASHOUT

## 2024-09-11 RX ORDER — CLONAZEPAM 0.5 MG/1
0.25 TABLET ORAL 3 TIMES DAILY
Qty: 45 TABLET | Refills: 2 | Status: SHIPPED | OUTPATIENT
Start: 2024-09-11 | End: 2024-10-11

## 2024-09-11 RX ORDER — RISPERIDONE 0.25 MG/1
0.25 TABLET ORAL NIGHTLY
Qty: 90 TABLET | Refills: 1 | Status: SHIPPED | OUTPATIENT
Start: 2024-09-11 | End: 2024-12-10

## 2024-09-11 ASSESSMENT — PAIN SCALES - GENERAL: PAINLEVEL: 0-NO PAIN

## 2024-09-11 ASSESSMENT — COLUMBIA-SUICIDE SEVERITY RATING SCALE - C-SSRS
1. SINCE LAST CONTACT, HAVE YOU WISHED YOU WERE DEAD OR WISHED YOU COULD GO TO SLEEP AND NOT WAKE UP?: NO
SUICIDE, SINCE LAST CONTACT: NO
2. HAVE YOU ACTUALLY HAD ANY THOUGHTS OF KILLING YOURSELF?: NO
2. HAVE YOU ACTUALLY HAD ANY THOUGHTS OF KILLING YOURSELF?: NO
TOTAL  NUMBER OF INTERRUPTED ATTEMPTS LIFETIME: NO
TOTAL  NUMBER OF ABORTED OR SELF INTERRUPTED ATTEMPTS LIFETIME: NO
TOTAL  NUMBER OF ABORTED OR SELF INTERRUPTED ATTEMPTS SINCE LAST CONTACT: NO
6. HAVE YOU EVER DONE ANYTHING, STARTED TO DO ANYTHING, OR PREPARED TO DO ANYTHING TO END YOUR LIFE?: NO
1. HAVE YOU WISHED YOU WERE DEAD OR WISHED YOU COULD GO TO SLEEP AND NOT WAKE UP?: NO
ATTEMPT LIFETIME: NO
TOTAL  NUMBER OF INTERRUPTED ATTEMPTS SINCE LAST CONTACT: NO
ATTEMPT SINCE LAST CONTACT: NO
6. HAVE YOU EVER DONE ANYTHING, STARTED TO DO ANYTHING, OR PREPARED TO DO ANYTHING TO END YOUR LIFE?: NO

## 2024-09-11 NOTE — PROGRESS NOTES
Outpatient Psychiatry      Subjective   Ara Dacosta, a 79 y.o. female, presents as an established patient for a medication management /outpatient psychiatry follow up appointment in person in the office     Diagnosis:   Depression , unspecified depression type (in remission , stable ) F 32.A  Generalized anxiety disorder ( stable ) F41.1     Treatment Plan/Recommendations: follow up for medications in 3 months   can call  for any treatment concerns   can continue self care and wellness efforts and maintain routine health screenings  Continue clonazepam 0.5 mg , 1/2 tablet three times a day for anxiety   Continue and increase sertraline from 50 mg , daily to 100 mg , daily for anxiety and depression   Continue risperidone 0.25 mg at bedtime each night for depression   Follow-up plan was discussed with patient.     Review with patient: Treatment plan reviewed with the patient.  Medication risks/benefit reviewed with the patient     HPI:sleep is good  no psychoses   mood stable   no new medical conditions    she is aware of the precautions and risks with clonazepam, as increased sedation , potential increased sedation with other sedating medications , and discussed avoiding alcohol, discussed the potential for effect on memory and cognitive functioning , and discussed the potential for tolerance and dependence and addiction   no issues with substance abuse   no concerns with cognitive functioning   reviewed psychotropic medications and rationale for them , and reconciled medication list   No new health conditions   No balance issues , no recent falls   Coping skills are strong and insight is good   No pain issues , no physical discomfort   Is tolerating sertraline with no side effects , anxiety is higher recently , discussed increasing dose of sertraline    she does not have any side effect concerns with her medications    I have personally reviewed the OARRS report for ARA DACOSTA. I have  considered the risks of abuse, dependence, addiction and diversion.   I have the following concerns: no concerns with oarrs.   Is the patient prescribed a combination of a benzodiazepine and opioid? No.   Last urine drug screening date October 2023 , results as expected , will order uds in December 2024 as she is going to be out of town for some trips before december   Date of the last Controlled Substance Agreement: 9/11/24   BENZODIAZEPINES   What is the patient's goal of therapy? to manage maximilian and reduce intense anxiety.  Is this being achieved with current treatment? yes , she is able to attend to day to day activities.    Ros medical and ros psych as noted above       Patient Active Problem List   Diagnosis    Acne    Acute bronchitis, unspecified    Age related osteoporosis    Anxiety    Auditory hallucination    Visual hallucinations    Chronic obstructive pulmonary disease (Multi)    Cough, persistent    Depression with anxiety    Exposure to COVID-19 virus    Fatigue    Heart palpitations    Leucocytosis    Microscopic hematuria    Other hyperlipidemia    Panic attacks    Pruritus    Sleep difficulties    SunDown syndrome    Tobacco use disorder    Vitamin D deficiency     Review with patient: Treatment plan reviewed with the patient.  Medication risks/benefit reviewed with the patient    Current Outpatient Medications:     cholecalciferol (Vitamin D-3) 25 MCG (1000 UT) capsule, Take 1 capsule (25 mcg) by mouth once daily., Disp: , Rfl:     clonazePAM (KlonoPIN) 0.5 mg tablet, Take 0.5 tablets (0.25 mg) by mouth 3 times a day., Disp: 45 tablet, Rfl: 2    diclofenac sodium 1 % kit, APPLY TOPICALLY TO AFFECTED AREA UP TO 4 TIMES DAILY, Disp: , Rfl:     ibuprofen 800 mg tablet, Take 1 tablet (800 mg) by mouth every 8 hours if needed., Disp: , Rfl:     levoFLOXacin (Levaquin) 750 mg tablet, Take 1 tablet (750 mg) by mouth once daily., Disp: , Rfl:     risperiDONE (RisperDAL) 0.25 mg tablet, Take 1 tablet (0.25  mg) by mouth once daily at bedtime., Disp: 90 tablet, Rfl: 1    sertraline (Zoloft) 100 mg tablet, Take 1 tablet (100 mg) by mouth once daily. No longer taking 50 mg , daily , this replaces previous orders, Disp: 90 tablet, Rfl: 1  Medical History:  Past Medical History:   Diagnosis Date    Personal history of other diseases of the respiratory system 04/20/2016    History of acute bronchitis    Sciatica, right side 12/30/2015    Sciatica, right side     Surgical History:  Past Surgical History:   Procedure Laterality Date    OTHER SURGICAL HISTORY  08/22/2017    Laparoscopic Excision Of Ectopic Pregnancy And Salpingectomy     Family History:  Family History   Problem Relation Name Age of Onset    Other (alcohol dependence) Father      Anxiety disorder Father      Alzheimer's disease Father      Other (CVA) Father      Other (Alcohol dependence) Paternal Grandfather      Anxiety disorder Paternal Grandfather       Social History:  Social History     Socioeconomic History    Marital status:      Spouse name: Not on file    Number of children: Not on file    Years of education: Not on file    Highest education level: Not on file   Occupational History    Not on file   Tobacco Use    Smoking status: Every Day     Current packs/day: 0.50     Types: Cigarettes    Smokeless tobacco: Never   Substance and Sexual Activity    Alcohol use: Never    Drug use: Never    Sexual activity: Not on file   Other Topics Concern    Not on file   Social History Narrative    Not on file     Social Determinants of Health     Financial Resource Strain: Not on file   Food Insecurity: Not on file   Transportation Needs: Not on file   Physical Activity: Not on file   Stress: Not on file   Social Connections: Not on file   Intimate Partner Violence: Not on file   Housing Stability: Not on file     Record Review: brief    Vitals:    09/11/24 1328   BP: 152/84   Pulse: 94   Resp: 16   Temp: 36.5 °C (97.7 °F)     Tasia Cage, APRN-CNS

## 2024-09-15 RX ORDER — SERTRALINE HYDROCHLORIDE 100 MG/1
100 TABLET, FILM COATED ORAL DAILY
Qty: 90 TABLET | Refills: 1 | Status: SHIPPED | OUTPATIENT
Start: 2024-09-15 | End: 2024-12-14

## 2024-12-20 ENCOUNTER — APPOINTMENT (OUTPATIENT)
Dept: BEHAVIORAL HEALTH | Facility: CLINIC | Age: 79
End: 2024-12-20
Payer: MEDICARE

## 2024-12-26 ENCOUNTER — TELEPHONE (OUTPATIENT)
Dept: BEHAVIORAL HEALTH | Facility: CLINIC | Age: 79
End: 2024-12-26
Payer: MEDICARE

## 2024-12-26 ENCOUNTER — DOCUMENTATION (OUTPATIENT)
Dept: BEHAVIORAL HEALTH | Facility: CLINIC | Age: 79
End: 2024-12-26
Payer: MEDICARE

## 2024-12-26 DIAGNOSIS — F41.1 GAD (GENERALIZED ANXIETY DISORDER): ICD-10-CM

## 2024-12-26 DIAGNOSIS — F32.A DEPRESSION, UNSPECIFIED DEPRESSION TYPE: ICD-10-CM

## 2024-12-26 RX ORDER — CLONAZEPAM 0.5 MG/1
0.25 TABLET ORAL 3 TIMES DAILY
Qty: 45 TABLET | Refills: 0 | Status: SHIPPED | OUTPATIENT
Start: 2024-12-26 | End: 2024-12-27 | Stop reason: SDUPTHER

## 2024-12-26 RX ORDER — SERTRALINE HYDROCHLORIDE 100 MG/1
100 TABLET, FILM COATED ORAL DAILY
Qty: 90 TABLET | Refills: 0 | Status: SHIPPED | OUTPATIENT
Start: 2024-12-26 | End: 2025-03-26

## 2024-12-26 RX ORDER — RISPERIDONE 0.25 MG/1
0.25 TABLET ORAL NIGHTLY
Qty: 90 TABLET | Refills: 0 | Status: SHIPPED | OUTPATIENT
Start: 2024-12-26 | End: 2025-03-26

## 2024-12-26 NOTE — PROGRESS NOTES
Nonbillable note : patient called and indicated has need for refills on medications . Reviewed oarrs , no concerns on oarrs , reviewed med order history in the [facility] emr , she is scheduled for follow up [facility]

## 2024-12-27 ENCOUNTER — DOCUMENTATION (OUTPATIENT)
Dept: BEHAVIORAL HEALTH | Facility: CLINIC | Age: 79
End: 2024-12-27
Payer: MEDICARE

## 2024-12-27 DIAGNOSIS — F41.1 GAD (GENERALIZED ANXIETY DISORDER): ICD-10-CM

## 2024-12-27 RX ORDER — CLONAZEPAM 0.5 MG/1
0.25 TABLET ORAL 3 TIMES DAILY
Qty: 45 TABLET | Refills: 0 | Status: SHIPPED | OUTPATIENT
Start: 2024-12-27 | End: 2025-01-26

## 2025-01-16 ENCOUNTER — APPOINTMENT (OUTPATIENT)
Facility: HOSPITAL | Age: 80
End: 2025-01-16
Payer: MEDICARE

## 2025-01-22 ENCOUNTER — APPOINTMENT (OUTPATIENT)
Dept: BEHAVIORAL HEALTH | Facility: CLINIC | Age: 80
End: 2025-01-22
Payer: MEDICARE

## 2025-01-22 DIAGNOSIS — F41.1 GAD (GENERALIZED ANXIETY DISORDER): ICD-10-CM

## 2025-01-22 DIAGNOSIS — F32.A DEPRESSION, UNSPECIFIED DEPRESSION TYPE: ICD-10-CM

## 2025-01-22 PROCEDURE — 99213 OFFICE O/P EST LOW 20 MIN: CPT | Performed by: CLINICAL NURSE SPECIALIST

## 2025-01-22 RX ORDER — CLONAZEPAM 0.5 MG/1
0.25 TABLET ORAL 3 TIMES DAILY
Qty: 45 TABLET | Refills: 2 | Status: SHIPPED | OUTPATIENT
Start: 2025-01-22 | End: 2025-02-21

## 2025-01-22 RX ORDER — SERTRALINE HYDROCHLORIDE 100 MG/1
150 TABLET, FILM COATED ORAL DAILY
Qty: 135 TABLET | Refills: 0 | Status: SHIPPED | OUTPATIENT
Start: 2025-01-22 | End: 2025-04-22

## 2025-01-22 RX ORDER — RISPERIDONE 0.25 MG/1
0.25 TABLET ORAL NIGHTLY
Qty: 90 TABLET | Refills: 0 | Status: SHIPPED | OUTPATIENT
Start: 2025-01-22 | End: 2025-04-22

## 2025-01-22 NOTE — PROGRESS NOTES
Outpatient Psychiatry      Subjective   Ara Dacosta, a 79 y.o. female, presents as an established patient for a medication management /outpatient psychiatry follow up appointment for an audio and video virtual appointment   Virtual or Telephone Consent    An interactive audio and video telecommunication system which permits real time communications between the patient (at the originating site) and provider (at the distant site) was utilized to provide this telehealth service.   Verbal consent was requested and obtained from Ara Dacosta on this date, 1/22/25 for a telehealth visit.       Diagnosis:   Depression , unspecified depression type (in remission , stable ) F32. A  Generalized anxiety disorder ( stable )   F 41.1     Treatment Plan/Recommendations: follow up for medications in 3 months   can call  for any treatment concerns   can continue self care and wellness efforts and maintain routine health screenings  Psychotropic medications :  Continue clonazepam 0.5 mg , 1/2 tablet three times a day for anxiety   Continue  sertraline 100 mg , 1 and 1/2 tablets daily for anxiety   Follow-up plan was discussed with patient.     Review with patient: Treatment plan reviewed with the patient.  Medication risks/benefit reviewed with the patient     HPI:  sleep is good  no psychoses   mood stable   no new medical conditions    she is aware of the precautions and risks with clonazepam, as increased sedation , potential increased sedation with other sedating medications , and discussed avoiding alcohol, discussed the potential for effect on memory and cognitive functioning , and discussed the potential for tolerance and dependence and addiction   no issues with substance abuse   no concerns with cognitive functioning   reviewed psychotropic medications and rationale for them , and reconciled medication list   No new health conditions   No balance issues , no recent falls   Coping skills are strong  and insight is good    she does not have any side effect concerns with her medications    I have personally reviewed the OARRS report for NITISH VELAZQUEZ. I have considered the risks of abuse, dependence, addiction and diversion.   I have the following concerns: no concerns with oarrs.   Is the patient prescribed a combination of a benzodiazepine and opioid? No.   Last urine drug screening up to date , results as expected fall 2024   Date of the last Controlled Substance Agreement: reviewed csa 9/2024  BENZODIAZEPINES   What is the patient's goal of therapy? to manage maximilian and reduce intense anxiety.  Is this being achieved with current treatment? yes , she is able to attend to day to day activities.         Psych ros and medical ros as noted above   Patient Active Problem List   Diagnosis    Acne    Acute bronchitis, unspecified    Age related osteoporosis    Anxiety    Auditory hallucination    Visual hallucinations    Chronic obstructive pulmonary disease (Multi)    Cough, persistent    Depression with anxiety    Exposure to COVID-19 virus    Fatigue    Heart palpitations    Leucocytosis    Microscopic hematuria    Other hyperlipidemia    Panic attacks    Pruritus    Sleep difficulties    SunDown syndrome    Tobacco use disorder    Vitamin D deficiency     Current Outpatient Medications:     cholecalciferol (Vitamin D-3) 25 MCG (1000 UT) capsule, Take 1 capsule (25 mcg) by mouth once daily., Disp: , Rfl:     clonazePAM (KlonoPIN) 0.5 mg tablet, Take 0.5 tablets (0.25 mg) by mouth 3 times a day. Ok to fill 12/27/24 due to travel plans , request for early fill one time . Oarrs ran 12/27/24, Disp: 45 tablet, Rfl: 0    diclofenac sodium 1 % kit, APPLY TOPICALLY TO AFFECTED AREA UP TO 4 TIMES DAILY, Disp: , Rfl:     ibuprofen 800 mg tablet, Take 1 tablet (800 mg) by mouth every 8 hours if needed., Disp: , Rfl:     levoFLOXacin (Levaquin) 750 mg tablet, Take 1 tablet (750 mg) by mouth once daily., Disp: , Rfl:      risperiDONE (RisperDAL) 0.25 mg tablet, Take 1 tablet (0.25 mg) by mouth once daily at bedtime., Disp: 90 tablet, Rfl: 0    sertraline (Zoloft) 100 mg tablet, Take 1 tablet (100 mg) by mouth once daily. No longer taking 50 mg , daily , this replaces previous orders, Disp: 90 tablet, Rfl: 0  Medical History:  Past Medical History:   Diagnosis Date    Personal history of other diseases of the respiratory system 04/20/2016    History of acute bronchitis    Sciatica, right side 12/30/2015    Sciatica, right side     Surgical History:  Past Surgical History:   Procedure Laterality Date    OTHER SURGICAL HISTORY  08/22/2017    Laparoscopic Excision Of Ectopic Pregnancy And Salpingectomy     Family History:  Family History   Problem Relation Name Age of Onset    Other (alcohol dependence) Father      Anxiety disorder Father      Alzheimer's disease Father      Other (CVA) Father      Other (Alcohol dependence) Paternal Grandfather      Anxiety disorder Paternal Grandfather       Social History:  Social History     Socioeconomic History    Marital status:      Spouse name: Not on file    Number of children: Not on file    Years of education: Not on file    Highest education level: Not on file   Occupational History    Not on file   Tobacco Use    Smoking status: Every Day     Current packs/day: 0.50     Types: Cigarettes    Smokeless tobacco: Never   Substance and Sexual Activity    Alcohol use: Never    Drug use: Never    Sexual activity: Not on file   Other Topics Concern    Not on file   Social History Narrative    Not on file     Social Drivers of Health     Financial Resource Strain: Not on file   Food Insecurity: Not on file   Transportation Needs: Not on file   Physical Activity: Not on file   Stress: Not on file   Social Connections: Not on file   Intimate Partner Violence: Not on file   Housing Stability: Not on file     Vitals:  There were no vitals filed for this visit.    Tasia Caeg, APRN-CNS

## 2025-03-19 ENCOUNTER — TELEPHONE (OUTPATIENT)
Dept: BEHAVIORAL HEALTH | Facility: CLINIC | Age: 80
End: 2025-03-19
Payer: MEDICARE

## 2025-03-19 ENCOUNTER — DOCUMENTATION (OUTPATIENT)
Dept: BEHAVIORAL HEALTH | Facility: CLINIC | Age: 80
End: 2025-03-19
Payer: MEDICARE

## 2025-03-19 DIAGNOSIS — F32.A DEPRESSION, UNSPECIFIED DEPRESSION TYPE: ICD-10-CM

## 2025-03-19 RX ORDER — RISPERIDONE 0.25 MG/1
0.25 TABLET ORAL NIGHTLY
Qty: 90 TABLET | Refills: 0 | Status: SHIPPED | OUTPATIENT
Start: 2025-03-19 | End: 2025-06-17

## 2025-03-19 NOTE — PROGRESS NOTES
Non billable note : patient requested refill on risperidone . Reviewed med order history in the [facility] emr and sent refill to pharmacy . She is scheduled for an appointment in April . [facility]

## 2025-04-22 ENCOUNTER — APPOINTMENT (OUTPATIENT)
Dept: BEHAVIORAL HEALTH | Facility: CLINIC | Age: 80
End: 2025-04-22
Payer: MEDICARE

## 2025-04-22 DIAGNOSIS — F32.A DEPRESSION, UNSPECIFIED DEPRESSION TYPE: ICD-10-CM

## 2025-04-22 DIAGNOSIS — F41.1 GAD (GENERALIZED ANXIETY DISORDER): ICD-10-CM

## 2025-04-22 PROCEDURE — 1159F MED LIST DOCD IN RCRD: CPT | Performed by: CLINICAL NURSE SPECIALIST

## 2025-04-22 PROCEDURE — 99214 OFFICE O/P EST MOD 30 MIN: CPT | Performed by: CLINICAL NURSE SPECIALIST

## 2025-04-22 PROCEDURE — 1160F RVW MEDS BY RX/DR IN RCRD: CPT | Performed by: CLINICAL NURSE SPECIALIST

## 2025-04-22 RX ORDER — RISPERIDONE 0.25 MG/1
0.25 TABLET ORAL NIGHTLY
Qty: 90 TABLET | Refills: 0 | Status: SHIPPED | OUTPATIENT
Start: 2025-04-22 | End: 2025-07-21

## 2025-04-22 RX ORDER — SERTRALINE HYDROCHLORIDE 100 MG/1
200 TABLET, FILM COATED ORAL DAILY
Qty: 180 TABLET | Refills: 0 | Status: SHIPPED | OUTPATIENT
Start: 2025-04-22 | End: 2025-04-22

## 2025-04-22 RX ORDER — CLONAZEPAM 0.5 MG/1
0.25 TABLET ORAL 3 TIMES DAILY
Qty: 45 TABLET | Refills: 0 | Status: SHIPPED | OUTPATIENT
Start: 2025-04-22 | End: 2025-05-22

## 2025-04-22 RX ORDER — SERTRALINE HYDROCHLORIDE 100 MG/1
200 TABLET, FILM COATED ORAL DAILY
Qty: 180 TABLET | Refills: 0 | Status: SHIPPED | OUTPATIENT
Start: 2025-04-22 | End: 2025-07-21

## 2025-04-22 NOTE — PROGRESS NOTES
Outpatient Psychiatry      Subjective   Ara Dacosta, a 80 y.o. female,  presents as an established patient for a medication management /outpatient psychiatry follow up appointment for an audio and video virtual appointment   Virtual or Telephone Consent     An interactive audio and video telecommunication system which permits real time communications between the patient (at the originating site) and provider (at the distant site) was utilized to provide this telehealth service.   Verbal consent was requested and obtained from Ara Dacosta on this date, 4/22/25 for a telehealth visit.       Diagnosis:   Depression , unspecified depression type (in remission , stable ) F32. A  Generalized anxiety disorder ( stable )   F 41.1     Treatment Plan/Recommendations: follow up for medications in 3 months   can call  for any treatment concerns   can continue self care and wellness efforts and maintain routine health screenings  Psychotropic medications :  Continue clonazepam 0.5 mg , 1/2 tablet three times a day for anxiety   Continue and increase sertraline 100 mg , 2 tablets daily for anxiety   Follow-up plan was discussed with patient.     Review with patient: Treatment plan reviewed with the patient.  Medication risks/benefit reviewed with the patient     HPI:  sleep is good  no psychoses   mood stable   Np periods of depressed moods since previous appointment   Family lives nearby and she sees them frequently   no new medical conditions    she is aware of the precautions and risks with clonazepam, as increased sedation , potential increased sedation with other sedating medications , and discussed avoiding alcohol, discussed the potential for effect on memory and cognitive functioning , and discussed the potential for tolerance and dependence and addiction   She says increased her clonazepam dose on her own , was taking one additional tablet per day , advised against that with psychoeducation ,she  says recently she was having intense morning anxiety when she woke up ,will send one order for one time early refill for clonazepam with provided psychoeducation as to the risks of taking more than prescribed, she verbalized understanding, advised no alcohol and no sedating prescribed meds or sedating otc meds   no issues with use of illicit drugs , she does not drink alcohol    no concerns with cognitive functioning   reviewed psychotropic medications and rationale for them , and reconciled medication list   No new health conditions   No balance issues , no recent falls     she does not have any side effect concerns with her medications    I have personally reviewed the OARRS report for NITISH VELAZQUEZ. I have considered the risks of abuse, dependence, addiction and diversion.   I have the following concerns: no concerns with oarrs.   Is the patient prescribed a combination of a benzodiazepine and opioid? No.   Last urine drug screening up to date , results as expected fall 2024   Date of the last Controlled Substance Agreement: 9/11/24   BENZODIAZEPINES   What is the patient's goal of therapy? to manage maximilian and reduce intense anxiety.  Is this being achieved with current treatment? yes , she is able to attend to day to day activities.         Psych ros and medical ros as noted above     Vitals:  There were no vitals filed for this visit.    Tasia Cage, APRN-CNS

## 2025-04-23 ENCOUNTER — TELEPHONE (OUTPATIENT)
Dept: OTHER | Age: 80
End: 2025-04-23
Payer: MEDICARE

## 2025-04-23 NOTE — TELEPHONE ENCOUNTER
Pharmacy needs OK for early refill on Clonazepam 0.5mgs - apparently [facility] didn't see it in the admin instructions & it needs to be re submitted    [facility] [place]

## 2025-05-14 ENCOUNTER — DOCUMENTATION (OUTPATIENT)
Dept: BEHAVIORAL HEALTH | Facility: CLINIC | Age: 80
End: 2025-05-14
Payer: MEDICARE

## 2025-05-14 DIAGNOSIS — F41.1 GAD (GENERALIZED ANXIETY DISORDER): ICD-10-CM

## 2025-05-14 RX ORDER — CLONAZEPAM 0.5 MG/1
0.25 TABLET ORAL 3 TIMES DAILY
Qty: 135 TABLET | Refills: 0 | Status: SHIPPED | OUTPATIENT
Start: 2025-05-14 | End: 2025-08-12

## 2025-05-14 NOTE — PROGRESS NOTES
Nonbillable note : spoke to patient whom leaves for out of state for a few months , and is in need of script for clonazepam , ran oarrs , no concerns , authorized script to be filled today for 90 day script due to travel plans and reviewed with patient med instructions . Reviewed med order history in the [facility] emr [facility]

## 2025-08-07 ENCOUNTER — APPOINTMENT (OUTPATIENT)
Dept: BEHAVIORAL HEALTH | Facility: CLINIC | Age: 80
End: 2025-08-07
Payer: MEDICARE

## 2025-08-07 DIAGNOSIS — F32.A DEPRESSION, UNSPECIFIED DEPRESSION TYPE: ICD-10-CM

## 2025-08-07 DIAGNOSIS — F41.1 GAD (GENERALIZED ANXIETY DISORDER): Primary | ICD-10-CM

## 2025-08-07 PROCEDURE — 1159F MED LIST DOCD IN RCRD: CPT | Performed by: CLINICAL NURSE SPECIALIST

## 2025-08-07 PROCEDURE — 1160F RVW MEDS BY RX/DR IN RCRD: CPT | Performed by: CLINICAL NURSE SPECIALIST

## 2025-08-07 PROCEDURE — 99213 OFFICE O/P EST LOW 20 MIN: CPT | Performed by: CLINICAL NURSE SPECIALIST

## 2025-08-07 RX ORDER — CLONAZEPAM 0.5 MG/1
0.25 TABLET ORAL 3 TIMES DAILY
Qty: 135 TABLET | Refills: 0 | Status: SHIPPED | OUTPATIENT
Start: 2025-08-07 | End: 2025-08-14 | Stop reason: SDUPTHER

## 2025-08-07 RX ORDER — RISPERIDONE 0.25 MG/1
0.25 TABLET ORAL NIGHTLY
Qty: 90 TABLET | Refills: 0 | Status: SHIPPED | OUTPATIENT
Start: 2025-08-07 | End: 2025-11-05

## 2025-08-07 RX ORDER — VENLAFAXINE HYDROCHLORIDE 75 MG/1
75 CAPSULE, EXTENDED RELEASE ORAL DAILY
Qty: 90 CAPSULE | Refills: 0 | Status: SHIPPED | OUTPATIENT
Start: 2025-08-07 | End: 2025-11-05

## 2025-08-14 ENCOUNTER — DOCUMENTATION (OUTPATIENT)
Dept: BEHAVIORAL HEALTH | Facility: CLINIC | Age: 80
End: 2025-08-14
Payer: MEDICARE

## 2025-08-14 ENCOUNTER — TELEPHONE (OUTPATIENT)
Dept: BEHAVIORAL HEALTH | Facility: CLINIC | Age: 80
End: 2025-08-14
Payer: MEDICARE

## 2025-08-14 DIAGNOSIS — F41.1 GAD (GENERALIZED ANXIETY DISORDER): ICD-10-CM

## 2025-08-14 RX ORDER — CLONAZEPAM 0.5 MG/1
0.25 TABLET ORAL 3 TIMES DAILY
Qty: 135 TABLET | Refills: 0 | Status: SHIPPED | OUTPATIENT
Start: 2025-08-14 | End: 2025-08-15 | Stop reason: SDUPTHER

## 2025-08-15 ENCOUNTER — DOCUMENTATION (OUTPATIENT)
Dept: BEHAVIORAL HEALTH | Facility: CLINIC | Age: 80
End: 2025-08-15
Payer: MEDICARE

## 2025-08-15 DIAGNOSIS — F41.1 GAD (GENERALIZED ANXIETY DISORDER): ICD-10-CM

## 2025-08-15 RX ORDER — CLONAZEPAM 0.5 MG/1
TABLET ORAL
Qty: 45 TABLET | Refills: 0 | Status: SHIPPED | OUTPATIENT
Start: 2025-08-15

## 2025-08-17 PROBLEM — F32.A DEPRESSION: Status: ACTIVE | Noted: 2023-11-06

## 2025-08-17 PROBLEM — F41.1 GAD (GENERALIZED ANXIETY DISORDER): Status: ACTIVE | Noted: 2025-08-17

## 2025-09-24 ENCOUNTER — APPOINTMENT (OUTPATIENT)
Dept: BEHAVIORAL HEALTH | Facility: CLINIC | Age: 80
End: 2025-09-24
Payer: MEDICARE